# Patient Record
Sex: MALE | Race: WHITE
[De-identification: names, ages, dates, MRNs, and addresses within clinical notes are randomized per-mention and may not be internally consistent; named-entity substitution may affect disease eponyms.]

---

## 2017-04-19 ENCOUNTER — HOSPITAL ENCOUNTER (OUTPATIENT)
Dept: HOSPITAL 47 - LABWHC1 | Age: 72
Discharge: HOME | End: 2017-04-19
Payer: MEDICARE

## 2017-04-19 DIAGNOSIS — M60.9: ICD-10-CM

## 2017-04-19 DIAGNOSIS — I10: ICD-10-CM

## 2017-04-19 DIAGNOSIS — I48.0: Primary | ICD-10-CM

## 2017-04-19 LAB
ALP SERPL-CCNC: 58 U/L (ref 38–126)
ALT SERPL-CCNC: 34 U/L (ref 21–72)
ANION GAP SERPL CALC-SCNC: 10 MMOL/L
AST SERPL-CCNC: 33 U/L (ref 17–59)
BUN SERPL-SCNC: 17 MG/DL (ref 9–20)
CALCIUM SPEC-MCNC: 10.1 MG/DL (ref 8.4–10.2)
CH: 34.2
CHCM: 35.4
CHLORIDE SERPL-SCNC: 102 MMOL/L (ref 98–107)
CK SERPL-CCNC: 358 U/L (ref 55–170)
CO2 SERPL-SCNC: 29 MMOL/L (ref 22–30)
ERYTHROCYTE [DISTWIDTH] IN BLOOD BY AUTOMATED COUNT: 4.31 M/UL (ref 4.3–5.9)
ERYTHROCYTE [DISTWIDTH] IN BLOOD: 13.8 % (ref 11.5–15.5)
GLUCOSE SERPL-MCNC: 93 MG/DL (ref 74–99)
HCT VFR BLD AUTO: 41.8 % (ref 39–53)
HDW: 2.78
HGB BLD-MCNC: 14.7 GM/DL (ref 13–17.5)
MCH RBC QN AUTO: 34.1 PG (ref 25–35)
MCHC RBC AUTO-ENTMCNC: 35.1 G/DL (ref 31–37)
MCV RBC AUTO: 97.2 FL (ref 80–100)
NON-AFRICAN AMERICAN GFR(MDRD): >60
POTASSIUM SERPL-SCNC: 3.4 MMOL/L (ref 3.5–5.1)
PROT SERPL-MCNC: 6.8 G/DL (ref 6.3–8.2)
SODIUM SERPL-SCNC: 141 MMOL/L (ref 137–145)
WBC # BLD AUTO: 24.6 K/UL (ref 3.8–10.6)

## 2017-04-19 PROCEDURE — 80053 COMPREHEN METABOLIC PANEL: CPT

## 2017-04-19 PROCEDURE — 85027 COMPLETE CBC AUTOMATED: CPT

## 2017-04-19 PROCEDURE — 82550 ASSAY OF CK (CPK): CPT

## 2017-04-19 PROCEDURE — 84443 ASSAY THYROID STIM HORMONE: CPT

## 2017-04-19 PROCEDURE — 36415 COLL VENOUS BLD VENIPUNCTURE: CPT

## 2017-04-19 PROCEDURE — 82553 CREATINE MB FRACTION: CPT

## 2017-04-25 ENCOUNTER — HOSPITAL ENCOUNTER (OUTPATIENT)
Dept: HOSPITAL 47 - CATHEP | Age: 72
LOS: 1 days | Discharge: HOME | End: 2017-04-26
Payer: MEDICARE

## 2017-04-25 VITALS — BODY MASS INDEX: 35.5 KG/M2

## 2017-04-25 DIAGNOSIS — Z87.891: ICD-10-CM

## 2017-04-25 DIAGNOSIS — Z79.82: ICD-10-CM

## 2017-04-25 DIAGNOSIS — N40.0: ICD-10-CM

## 2017-04-25 DIAGNOSIS — Z79.01: ICD-10-CM

## 2017-04-25 DIAGNOSIS — Z79.84: ICD-10-CM

## 2017-04-25 DIAGNOSIS — Z99.89: ICD-10-CM

## 2017-04-25 DIAGNOSIS — Z82.49: ICD-10-CM

## 2017-04-25 DIAGNOSIS — Z95.0: ICD-10-CM

## 2017-04-25 DIAGNOSIS — E11.9: ICD-10-CM

## 2017-04-25 DIAGNOSIS — Z79.899: ICD-10-CM

## 2017-04-25 DIAGNOSIS — I10: ICD-10-CM

## 2017-04-25 DIAGNOSIS — G47.33: ICD-10-CM

## 2017-04-25 DIAGNOSIS — I48.0: Primary | ICD-10-CM

## 2017-04-25 DIAGNOSIS — I47.1: ICD-10-CM

## 2017-04-25 LAB
ANION GAP SERPL CALC-SCNC: 8 MMOL/L
BUN SERPL-SCNC: 18 MG/DL (ref 9–20)
CALCIUM SPEC-MCNC: 10.2 MG/DL (ref 8.4–10.2)
CELLS COUNTED: 200
CH: 34.4
CHCM: 35.5
CHLORIDE SERPL-SCNC: 109 MMOL/L (ref 98–107)
CO2 SERPL-SCNC: 24 MMOL/L (ref 22–30)
ERYTHROCYTE [DISTWIDTH] IN BLOOD BY AUTOMATED COUNT: 4.54 M/UL (ref 4.3–5.9)
ERYTHROCYTE [DISTWIDTH] IN BLOOD: 14.1 % (ref 11.5–15.5)
GLUCOSE BLD-MCNC: 103 MG/DL (ref 75–99)
GLUCOSE BLD-MCNC: 124 MG/DL (ref 75–99)
GLUCOSE BLD-MCNC: 134 MG/DL (ref 75–99)
GLUCOSE BLD-MCNC: 136 MG/DL (ref 75–99)
GLUCOSE BLD-MCNC: 138 MG/DL (ref 75–99)
GLUCOSE BLD-MCNC: 143 MG/DL (ref 75–99)
GLUCOSE BLD-MCNC: 144 MG/DL (ref 75–99)
GLUCOSE SERPL-MCNC: 116 MG/DL (ref 74–99)
HCT VFR BLD AUTO: 44.2 % (ref 39–53)
HDW: 2.78
HGB BLD-MCNC: 15.3 GM/DL (ref 13–17.5)
MCH RBC QN AUTO: 33.8 PG (ref 25–35)
MCHC RBC AUTO-ENTMCNC: 34.7 G/DL (ref 31–37)
MCV RBC AUTO: 97.3 FL (ref 80–100)
NON-AFRICAN AMERICAN GFR(MDRD): >60
POTASSIUM SERPL-SCNC: 3.9 MMOL/L (ref 3.5–5.1)
SODIUM SERPL-SCNC: 141 MMOL/L (ref 137–145)
WBC # BLD AUTO: 30.8 K/UL (ref 3.8–10.6)
WBC (PEROX): 30.33

## 2017-04-25 PROCEDURE — 93609 INTRA-VNTR MAPG TCHYCAR SITE: CPT

## 2017-04-25 PROCEDURE — 93656 COMPRE EP EVAL ABLTJ ATR FIB: CPT

## 2017-04-25 PROCEDURE — 80048 BASIC METABOLIC PNL TOTAL CA: CPT

## 2017-04-25 PROCEDURE — 93005 ELECTROCARDIOGRAM TRACING: CPT

## 2017-04-25 PROCEDURE — 93662 INTRACARDIAC ECG (ICE): CPT

## 2017-04-25 PROCEDURE — 85025 COMPLETE CBC W/AUTO DIFF WBC: CPT

## 2017-04-25 PROCEDURE — 83036 HEMOGLOBIN GLYCOSYLATED A1C: CPT

## 2017-04-25 PROCEDURE — 92960 CARDIOVERSION ELECTRIC EXT: CPT

## 2017-04-25 RX ADMIN — ATENOLOL SCH MG: 50 TABLET ORAL at 14:01

## 2017-04-25 RX ADMIN — FUROSEMIDE SCH MG: 20 TABLET ORAL at 17:09

## 2017-04-25 RX ADMIN — LISINOPRIL SCH MG: 20 TABLET ORAL at 20:41

## 2017-04-25 RX ADMIN — TERAZOSIN HYDROCHLORIDE SCH MG: 5 CAPSULE ORAL at 20:41

## 2017-04-25 RX ADMIN — SODIUM CHLORIDE, PRESERVATIVE FREE SCH ML: 5 INJECTION INTRAVENOUS at 20:49

## 2017-04-25 RX ADMIN — POTASSIUM CHLORIDE SCH MEQ: 750 TABLET, EXTENDED RELEASE ORAL at 20:41

## 2017-04-25 NOTE — P.PCN
Preoperative Diagnosis: 


Procedures performed (PVI - CRYO Ablation)


Invasive hemodynamic monitoring while general anesthesia, right femoral 

arterial line for monitoring and sampling


Comprehensive diagnostic EP study with attempted arrhythmia induction


CS pacing and recording


Catheter the mapping of the tachycardia (NOT 3D mapping)


Intracardiac echocardiography


Pulmonary vein isolation with transseptal and comprehensive EPS, [id number]


Electrical cardioversion for atrial tachycardia





Procedure details


Patient was brought to the EP lab in a fasting state.


Written informed consent was obtained prior to the procedure.


Procedure performed under general anesthesia


After initial muscle relaxant use, muscle relaxants were not given thereafter 

in order to assess phrenic nerve during procedure





Patient prepped and draped as per protocol


Full cryo-set up with standard preparation of the cryoablation tools done





Femoral Venous access obtained on the right and left groins


Sheaths placed


Diagnostic catheters for the high right atrium, phrenic nerve stimulation and 

pacing, His bundle, RV and coronary sinus placed


Intracardiac echo catheter placed


Long sheath placed in the right atrium


Left and right transseptal catheterization performed under intracardiac echo 

guidance


Intravenous heparin with aCT above 300


Later, catheter positioning and balloon positioning under intracardiac echo





Baseline measurements


Patient in atrial fibrillation started study,  seconds,  ms HV 

interval 58 ms





Comprehensive diagnostic EP study 


Atrial pacing performed from the high right atrium and the coronary sinus


Burst pacing in the RV


Transseptal catheterization performed


RA pressure 19/6/14


LA pressure 22/8/15





Transseptal catheterization performed with standard sheath.  The cryoablation 

sheath was then placed with an over the wire exchange without any acute 

complications.





All 4 pulmonary veins were isolated in the following sequence: Left superior 

followed by left inferior followed by right superior followed by right inferior





The cryo-ablation balloon was placed at the os of each vein


1.5 mL of IV dye was injected to confirm an occluded vein


Goal during cryoablation was to achieve -30C in the first 30 seconds.  If not 

the balloon was repositioned to obtain this result


After completion of Cryoblation with durations from 180-240 seconds, entrance 

block was confirmed with the Attain circular catheter in a roving fashion 

around the antrum of the pulmonary veins


Phrenic nerve pacing was performed from the SVC, right innominate vein area and 

diaphragm voltage was monitored as well as manually





Left superior pulmonary vein


First cryoablation 


Duration of cryoablation lesion 225


-30C achieved at 30 seconds


-40C achieved at 21 seconds


Minimum temperature achieved -55C


Thaw time greater than 10 seconds





Second cryoablation


Duration of cryoablation lesion 120 seconds


-30C achieved at 30 seconds


-40C achieved at 48 seconds


Minimum temperature achieved -44C


Thaw time 15.5 seconds


Vein isolated yes








Left inferior pulmonary vein


First cryoablation 


Duration of cryoablation lesion 240 seconds


-30C achieved at 33 seconds


-40C achieved at 90 seconds


Minimum temperature achieved -46C


Thaw time 1.3 seconds


Vein isolated 





Second cryoablation


Duration of cryoablation lesion 120 seconds


-30C achieved at 25 seconds


-40C achieveFirst cryoablation 48 seconds


Minimum temperature achieved -51C


Thaw time 11.2 seconds


Vein isolated yes





Right superior pulmonary vein, during phrenic nerve pacing





First cryoablation 


Duration of cryoablation lesion 180 seconds


-30C achieved at 27 seconds


-40C achieved at 40 seconds


Minimum temperature achieved -57C


Thaw time 23 seconds





Second cryoablation


Duration of cryoablation lesion 175 seconds


-30C achieved at 27 seconds


-40C achieved at 40 seconds


Minimum temperature achieved -56C


Thaw time 26 seconds


Vein isolated yes





Right inferior pulmonary vein, during phrenic nerve pacing


First cryoablation 


Duration of cryoablation lesion 120 seconds


-30C achieved at 32 seconds


-40C achieved at 96 seconds


Minimum temperature achieved -41C


Thaw time 6 seconds





Second cryoablation


Duration of cryoablation lesion 180 seconds


-30C achieved at 25 seconds


-40C achieved at 37 seconds


Minimum temperature achieved -56C


Thaw time 18 seconds


Vein isolated yes





At the end of the procedure the Achieve catheter was once again used to check 

for entrance block


Phrenic nerve stimulation was performed to confirm diaphragmatic stimulation 

the end of the procedure


Cine fluoroscopy was performed at the very end of the procedure to confirm 

movement of both diaphragms with inspiration and expiration





At the end of the procedure the patient was extubated


Heparin was reversed


Venous sheaths were removed and hemostasis assured





Result


Successful pulmonary vein isolation using cryo-ablation


Complete entrance block in all 4 veins confirmed


No evidence for phrenic nerve injury


Electrical cardioversion for atrial tachycardia 215 Josiah seconds






































Anesthesia: GETA


Disposition: floor

## 2017-04-25 NOTE — P.PCN
Preoperative Diagnosis: 


Dual-chamber biventricular pacemaker interrogated prior to the procedure


Reprogrammed to DDD 60 PPM





At the end of the procedure


Dual-chamber biventricular pacemaker interrogated, lead impedance is stable, 

sensing within normal limits


Reprogrammed once again to DDDR  the short AV delay

## 2017-04-26 VITALS
SYSTOLIC BLOOD PRESSURE: 116 MMHG | RESPIRATION RATE: 14 BRPM | DIASTOLIC BLOOD PRESSURE: 59 MMHG | TEMPERATURE: 97.9 F | HEART RATE: 56 BPM

## 2017-04-26 LAB
ANION GAP SERPL CALC-SCNC: 8 MMOL/L
BUN SERPL-SCNC: 13 MG/DL (ref 9–20)
CALCIUM SPEC-MCNC: 9.9 MG/DL (ref 8.4–10.2)
CHLORIDE SERPL-SCNC: 104 MMOL/L (ref 98–107)
CO2 SERPL-SCNC: 28 MMOL/L (ref 22–30)
GLUCOSE BLD-MCNC: 109 MG/DL (ref 75–99)
GLUCOSE BLD-MCNC: 122 MG/DL (ref 75–99)
GLUCOSE SERPL-MCNC: 114 MG/DL (ref 74–99)
HEMOGLOBIN A1C: 6 % (ref 4.2–6.1)
NON-AFRICAN AMERICAN GFR(MDRD): >60
POTASSIUM SERPL-SCNC: 3.8 MMOL/L (ref 3.5–5.1)
SODIUM SERPL-SCNC: 140 MMOL/L (ref 137–145)

## 2017-04-26 RX ADMIN — TERAZOSIN HYDROCHLORIDE SCH MG: 5 CAPSULE ORAL at 09:24

## 2017-04-26 RX ADMIN — FUROSEMIDE SCH MG: 20 TABLET ORAL at 09:24

## 2017-04-26 RX ADMIN — POTASSIUM CHLORIDE SCH MEQ: 750 TABLET, EXTENDED RELEASE ORAL at 09:24

## 2017-04-26 RX ADMIN — LISINOPRIL SCH MG: 20 TABLET ORAL at 09:24

## 2017-04-26 RX ADMIN — ATENOLOL SCH MG: 50 TABLET ORAL at 09:24

## 2017-04-26 RX ADMIN — SODIUM CHLORIDE, PRESERVATIVE FREE SCH ML: 5 INJECTION INTRAVENOUS at 09:24

## 2017-04-26 NOTE — P.DS
Providers


Attending physician: 


Allan Coker





Primary care physician: 


Richard Cerna





[facility] Course: 





Patient is doing well.  He did complain of some palpitations.  No chest 

discomfort no dizziness no lightheadedness no breathing trouble





On examination he is afebrile him a temperature 98.9F, pulse rate in the 80s, 

blood pressure 131/62 mmHg


No JVD and


Normal heart sounds normal S1 normal S2


No murmurs no gallops


No rub


Breath sounds are normal


No rhonchi no crackles


Abdomen soft nontender


Groins have healed well no hematoma





Impression


Atrial fibrillation, paroxysmal, status post cryoablation, pulmonary vein 

isolation


Residual atrial tachycardia cycle length 215 ms, extrapulmonary focus


Diabetes


Central obesity


Bradycardia status post biventricular pacemaker


Normal LV function





Plan


Increase flecainide 200 mg twice daily


Patient states that he has muscle pains with Toprol give him switching to 

atenolol 50 mg daily





Continue anticoagulation lifelong





Plan


Anticoagulated and antiarrhythmic drug therapy for the management


If he continues to have symptoms and documented atrial fibrillation despite 

this drug does, mapping and ablation will be considered





Follow-up within the week for a groin check


Patient Condition at Discharge: Stable





Plan - Discharge Summary


New Discharge Prescriptions: 


Atenolol [Tenormin] 50 mg PO DAILY #1 tab


Flecainide [Tambocor] 100 mg PO Q12HR #1 tablet


Discharge Medication List





Furosemide 20 mg PO BID 07/25/16 [History]


Lisinopril [Zestril] 20 mg PO BID 07/25/16 [History]


Terazosin [Hytrin] 5 mg PO BID 07/25/16 [History]


Rivaroxaban [Xarelto] 20 mg PO DAILY 08/29/16 [History]


metFORMIN HCL [Glucophage] 500 mg PO BID 08/29/16 [History]


sitaGLIPtin PHOSPHATE [Januvia] 50 mg PO QAM 08/29/16 [History]


Potassium Chloride [Klor-Con 10] 10 meq PO BID 04/21/17 [History]


Atenolol [Tenormin] 50 mg PO DAILY #1 tab 04/26/17 [Rx]


Flecainide [Tambocor] 100 mg PO Q12HR #1 tablet 04/26/17 [Rx]








Activity/Diet/Wound Care/Special Instructions: 


Post EP study - Ablation instructions





1.  Keep access sites dry for 2 days.


2.  No heavy lifting or straining for 2 days.


3.  Avoid bending the hips repeatedly for 2 days.


4.  You may go up and down stairs slowly  





Call if the following is noted





1.  Bleeding, increasing swelling or pain at the access sites.


2.  Increasing chest discomfort, especially upon taking a deep breath.


3.  Increasing shortness of breath, at rest or with exertion.


4.  Undue cough / phlegm


5.  Difficulty or pain while swallowing.


6.  Pain or change in color in the extremities.


7.  Fever, chills, rigors.


8.  Increasing headache or neurologic symptoms.


9.  Dizziness, fainting, palpitations





Groin check on Tuesday attention trisha

## 2020-07-06 ENCOUNTER — HOSPITAL ENCOUNTER (OUTPATIENT)
Dept: HOSPITAL 47 - CATHEP | Age: 75
Discharge: HOME | End: 2020-07-06
Attending: INTERNAL MEDICINE
Payer: MEDICARE

## 2020-07-06 VITALS — BODY MASS INDEX: 34.5 KG/M2

## 2020-07-06 VITALS — SYSTOLIC BLOOD PRESSURE: 126 MMHG | DIASTOLIC BLOOD PRESSURE: 76 MMHG | HEART RATE: 62 BPM

## 2020-07-06 VITALS — TEMPERATURE: 97.8 F | RESPIRATION RATE: 16 BRPM

## 2020-07-06 DIAGNOSIS — C91.10: ICD-10-CM

## 2020-07-06 DIAGNOSIS — I48.19: Primary | ICD-10-CM

## 2020-07-06 DIAGNOSIS — Z79.84: ICD-10-CM

## 2020-07-06 DIAGNOSIS — E11.9: ICD-10-CM

## 2020-07-06 DIAGNOSIS — C61: ICD-10-CM

## 2020-07-06 DIAGNOSIS — E78.5: ICD-10-CM

## 2020-07-06 DIAGNOSIS — I10: ICD-10-CM

## 2020-07-06 DIAGNOSIS — I47.2: ICD-10-CM

## 2020-07-06 DIAGNOSIS — Z79.899: ICD-10-CM

## 2020-07-06 DIAGNOSIS — E66.9: ICD-10-CM

## 2020-07-06 DIAGNOSIS — G45.3: ICD-10-CM

## 2020-07-06 DIAGNOSIS — I87.1: ICD-10-CM

## 2020-07-06 DIAGNOSIS — Z95.0: ICD-10-CM

## 2020-07-06 DIAGNOSIS — I49.3: ICD-10-CM

## 2020-07-06 DIAGNOSIS — G47.33: ICD-10-CM

## 2020-07-06 LAB
ANION GAP SERPL CALC-SCNC: 7 MMOL/L
BUN SERPL-SCNC: 20 MG/DL (ref 9–20)
CALCIUM SPEC-MCNC: 10.1 MG/DL (ref 8.4–10.2)
CHLORIDE SERPL-SCNC: 105 MMOL/L (ref 98–107)
CO2 SERPL-SCNC: 26 MMOL/L (ref 22–30)
GLUCOSE BLD-MCNC: 124 MG/DL (ref 75–99)
GLUCOSE SERPL-MCNC: 131 MG/DL (ref 74–99)
POTASSIUM SERPL-SCNC: 3.4 MMOL/L (ref 3.5–5.1)
SODIUM SERPL-SCNC: 138 MMOL/L (ref 137–145)

## 2020-07-06 PROCEDURE — 36005 INJECTION EXT VENOGRAPHY: CPT

## 2020-07-06 PROCEDURE — 80048 BASIC METABOLIC PNL TOTAL CA: CPT

## 2020-07-06 PROCEDURE — 75820 VEIN X-RAY ARM/LEG: CPT

## 2020-07-06 PROCEDURE — 76000 FLUOROSCOPY <1 HR PHYS/QHP: CPT

## 2020-07-06 PROCEDURE — 92960 CARDIOVERSION ELECTRIC EXT: CPT

## 2020-07-06 NOTE — P.PCN
Preoperative Diagnosis: 


Left upper extremity venogram


15 mL of IV dye injected at the left upper extremity


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





Plan


At the time of pacemaker generator change the nonfunctioning His bundle lead 

will be removed and the original RV apical lead will be plugged into the RV port


The LV lead is functioning normally

## 2020-07-06 NOTE — P.PCN
Preoperative Diagnosis: 


Diagnosis


Lead in the RV port has elevated thresholds


Persistent symptomatic atrial fibrillation


Frequent PVCs


History of A. fib ablation, successful


History of biventricular pacemaker for severe bradycardia, with LV lead 

implanted at [place] and functioning well


His bundle lead implanted at [place] subsequently


Non-capture of this His bundle lead at high output





Procedure #1


Cinefluoroscopy cinefluoroscopy of the leads was performed


The patient has an atrial lead in the right atrial appendage


LV lead in stable position


RV lead in the apex of the right ventricle which is


A Metronic 3830-lead screwed in the His bundle area





This [employer] pacemaker was interrogated


Biventricular pacemaker serial number [id number]


Atrial pacing threshold 1 V at 0.4 ms, pacing impedance 490 ohms P waves 1 mV 

patient in A. fib


LV lead threshold 1 V at 0.5 ms, P4-can pacing impedance 5 and 30 ohms, P4-can


Threshold in in 3-M2 is 1 warted 1.5 ms





Cardioversion for atrial fibrillation, persistent, symptomatic


Successful electrical cardioversion to an atrial paced rhythm with a single 

shock 360 J biphasic in AP configuration





Device was then programmed to DDDR 





Plan


Patient feels that he is intolerant of lisinopril and has a lot of side effects 

from this


I will switch to losartan 50 mg by mouth daily in the evening and increase 

metoprolol succinate 100 mg in the morning


I emphasized the need for continuing anticoagulation for stroke prevention 

particularly since he has a past history of amaurosis fugax

## 2021-06-15 ENCOUNTER — HOSPITAL ENCOUNTER (OUTPATIENT)
Dept: HOSPITAL 47 - CATHEP | Age: 76
Discharge: HOME | End: 2021-06-15
Attending: INTERNAL MEDICINE
Payer: MEDICARE

## 2021-06-15 VITALS — SYSTOLIC BLOOD PRESSURE: 154 MMHG | HEART RATE: 49 BPM | DIASTOLIC BLOOD PRESSURE: 78 MMHG

## 2021-06-15 VITALS — TEMPERATURE: 97.9 F | RESPIRATION RATE: 16 BRPM

## 2021-06-15 VITALS — BODY MASS INDEX: 36.1 KG/M2

## 2021-06-15 DIAGNOSIS — I83.90: ICD-10-CM

## 2021-06-15 DIAGNOSIS — E66.9: ICD-10-CM

## 2021-06-15 DIAGNOSIS — E72.12: ICD-10-CM

## 2021-06-15 DIAGNOSIS — Z72.0: ICD-10-CM

## 2021-06-15 DIAGNOSIS — I42.0: ICD-10-CM

## 2021-06-15 DIAGNOSIS — Z20.822: ICD-10-CM

## 2021-06-15 DIAGNOSIS — Z45.010: Primary | ICD-10-CM

## 2021-06-15 DIAGNOSIS — E11.9: ICD-10-CM

## 2021-06-15 DIAGNOSIS — Z79.899: ICD-10-CM

## 2021-06-15 DIAGNOSIS — I48.91: ICD-10-CM

## 2021-06-15 DIAGNOSIS — Z85.46: ICD-10-CM

## 2021-06-15 DIAGNOSIS — Z79.84: ICD-10-CM

## 2021-06-15 DIAGNOSIS — I10: ICD-10-CM

## 2021-06-15 DIAGNOSIS — I44.2: ICD-10-CM

## 2021-06-15 DIAGNOSIS — Z84.81: ICD-10-CM

## 2021-06-15 DIAGNOSIS — G45.3: ICD-10-CM

## 2021-06-15 DIAGNOSIS — Z98.890: ICD-10-CM

## 2021-06-15 DIAGNOSIS — Z85.6: ICD-10-CM

## 2021-06-15 DIAGNOSIS — Z90.89: ICD-10-CM

## 2021-06-15 DIAGNOSIS — Z79.01: ICD-10-CM

## 2021-06-15 DIAGNOSIS — G47.33: ICD-10-CM

## 2021-06-15 LAB
ANION GAP SERPL CALC-SCNC: 5 MMOL/L
BASOPHILS # BLD AUTO: 0.1 K/UL (ref 0–0.2)
BASOPHILS NFR BLD AUTO: 1 %
BUN SERPL-SCNC: 15 MG/DL (ref 9–20)
CALCIUM SPEC-MCNC: 10.4 MG/DL (ref 8.4–10.2)
CHLORIDE SERPL-SCNC: 103 MMOL/L (ref 98–107)
CO2 SERPL-SCNC: 33 MMOL/L (ref 22–30)
EOSINOPHIL # BLD AUTO: 0.1 K/UL (ref 0–0.7)
EOSINOPHIL NFR BLD AUTO: 0 %
ERYTHROCYTE [DISTWIDTH] IN BLOOD BY AUTOMATED COUNT: 4.43 M/UL (ref 4.3–5.9)
ERYTHROCYTE [DISTWIDTH] IN BLOOD: 13.8 % (ref 11.5–15.5)
GLUCOSE BLD-MCNC: 158 MG/DL (ref 75–99)
GLUCOSE SERPL-MCNC: 161 MG/DL (ref 74–99)
HCT VFR BLD AUTO: 44.7 % (ref 39–53)
HGB BLD-MCNC: 15.6 GM/DL (ref 13–17.5)
LYMPHOCYTES # SPEC AUTO: 20 K/UL (ref 1–4.8)
LYMPHOCYTES NFR SPEC AUTO: 81 %
MCH RBC QN AUTO: 35.2 PG (ref 25–35)
MCHC RBC AUTO-ENTMCNC: 34.9 G/DL (ref 31–37)
MCV RBC AUTO: 100.8 FL (ref 80–100)
MONOCYTES # BLD AUTO: 0.2 K/UL (ref 0–1)
MONOCYTES NFR BLD AUTO: 1 %
NEUTROPHILS # BLD AUTO: 3.4 K/UL (ref 1.3–7.7)
NEUTROPHILS NFR BLD AUTO: 14 %
PLATELET # BLD AUTO: 131 K/UL (ref 150–450)
POTASSIUM SERPL-SCNC: 3.6 MMOL/L (ref 3.5–5.1)
SODIUM SERPL-SCNC: 141 MMOL/L (ref 137–145)
WBC # BLD AUTO: 24.8 K/UL (ref 3.8–10.6)

## 2021-06-15 PROCEDURE — 85025 COMPLETE CBC W/AUTO DIFF WBC: CPT

## 2021-06-15 PROCEDURE — 33229 REMV&REPLC PM GEN MULT LEADS: CPT

## 2021-06-15 PROCEDURE — 87635 SARS-COV-2 COVID-19 AMP PRB: CPT

## 2021-06-15 PROCEDURE — 80048 BASIC METABOLIC PNL TOTAL CA: CPT

## 2021-06-15 NOTE — CE
CARDIAC ELECTROPHYSIOLOGY REPORT



Jerry Humerickhouse has a biventricular pacemaker whose generator is at [facility].  He was

brought in for a biventricular pacemaker generator change.



The patient is brought to the EP lab in a fasting state. Written informed consent was

obtained prior to the procedure.  The left shoulder area was prepped and draped as per

protocol.  1% lidocaine was used for local anesthesia.  A 4 cm incision was made over

the generator and carried down to the level of the generator.  The generator was

explanted.  Partial capsulectomy was performed.



Originally, he had an atrial lead, RV lead and LV lead implanted by myself several

years back.  Subsequently, the patient moved to [place] and a His bundle lead was added

and pacing was performed with HIS and LV combined.  However, the His bundle has

complete non capture and therefore I plan to extract the His bundle during this

procedure.



The His bundle lead was freed from the capsule and the lead sleeve and then the lead

was extracted with manual traction.  He remained stable through this part of the

procedure.



The RV lead cap was removed and a new generator was implanted connected to the RV lead,

LV lead and atrial lead.



The atrial lead is an Isoflex Optum 1944, 52 cm in length and serial number [id number].

The pacing threshold 0.9 V at 0.4 milliseconds.  P waves 1.6 mV, pacing impedance 490

ohms.



The RV lead is a tendril SDX, model #2088TC, 58 cm in length and serial number [id number]

[id number].  Pacing threshold 1.5 V at 0.4 milliseconds, pacing impedance of 440 ohms.

The LV lead was a Quattro 1458 Q, 75 cm in length and serial number [id number].  The

pacing threshold M3-M2 was 2 V at 0.5 milliseconds, pacing impedance of 990 ohms.



The new generator implanted was a biventricular pacemaker, [employer] model

number DJ3724, serial #[id number].



The device was then programmed to DDD mode  with an AV delay of about 200

milliseconds and LV offset of 20 milliseconds.



The generator was then placed in subfascial pocket and the wound was closed in 3 layers

and dressed per protocol.



RESULT:

Successful extraction of the His bundle lead and and biventricular pacemaker generator

change.



PLAN:

IV vancomycin and 24 hours of oral Keflex.





MMODL / IJN: [id number] / Job#: [id number]

## 2021-06-15 NOTE — P.EPPROC
- EP Procedure Note


Electrophysiology Procedure Note: 





Cinefluoroscopy of the leads


Cinefluoroscopy of the leads was performed prior to the procedure the atrial 

lead is in the right atrial appendage


His bundle lead appeared to be in the vicinity of the tricuspid area on 

fluoroscopy of his complete non-capture this fundally


LV lead in the lateral vein


RV lead in the apex





At the end of the procedure after extraction of the His bundle lead V1 left with

the atrial lead RV lead in the LV lead


No fractures or breaks noted.

## 2023-04-10 ENCOUNTER — HOSPITAL ENCOUNTER (OUTPATIENT)
Dept: HOSPITAL 47 - CATHCVL | Age: 78
Discharge: HOME | End: 2023-04-10
Attending: INTERNAL MEDICINE
Payer: MEDICARE

## 2023-04-10 VITALS — RESPIRATION RATE: 16 BRPM

## 2023-04-10 VITALS — SYSTOLIC BLOOD PRESSURE: 142 MMHG | HEART RATE: 70 BPM | DIASTOLIC BLOOD PRESSURE: 79 MMHG

## 2023-04-10 VITALS — BODY MASS INDEX: 30.9 KG/M2

## 2023-04-10 DIAGNOSIS — I25.10: Primary | ICD-10-CM

## 2023-04-10 DIAGNOSIS — R94.39: ICD-10-CM

## 2023-04-10 LAB — GLUCOSE BLD-MCNC: 117 MG/DL (ref 70–110)

## 2023-04-10 PROCEDURE — 93799 UNLISTED CV SVC/PROCEDURE: CPT

## 2023-04-10 PROCEDURE — 93458 L HRT ARTERY/VENTRICLE ANGIO: CPT

## 2023-04-10 NOTE — P.CARDCATH
Description of Procedure: 


PROCEDURES PERFORMED: Left heart catheterization, bilateral coronary 

angiography, iFR RCA





INDICATION: Abnormal stress test with inferior fixed defect with inferolateral 

reversibility





CONSENT:I have discussed the risks, benefits and alternative therapies for the 

above-mentioned procedure and for both sedation/analgesia as well as necessary 

blood product administration, if indicated, as they pertain to this patient.  

The patient has indicated understanding and acceptance of the risks and 

procedures discussed.





PROCEDURE: After the risks, benefits and alternatives of the above mentioned 

procedure explained in detail with the patient, informed consent was obtained.  

Patient was taken to the catheterization lab and prepped and draped in usual 

fashion.  1% lidocaine was used to anesthetize the right radial artery.  A 6-

Swedish sheath was placed in the right radial artery using modified Seldinger 

technique.  Left coronary angiography was performed with a 5-Swedish JL 3.0 

catheter and right coronary angiography was performed with a 5-Swedish JR5 

catheter in various views.  A 5-Swedish FR5 catheter was inserted into the left 

ventricle and pressure measurements were obtained. 





The decision was made to perform iFR of the RCA.  Heparin was given.  A 6-Swedish

ER to guide was used to engage the RCA.  A 0.014 pressure wire was advanced into

the proximal RCA and normalized.  The wire was then advanced 1 cm distal to the 

RCA lesion and iFR was performed and was normal at 0.97.  The wire and catheter 

were then removed.





The right radial sheath was removed and a TR band was placed with hemostasis 

achieved.  The patient tolerated the procedure well.  Patient was transported 

back to the post catheterization holding area in stable condition.





Conscious Sedation: Patient was monitored under the direct supervision of myself

for conscious sedation using Versed and fentanyl for a total duration of 25 

minutes   


HEMODYNAMICS: 


Aorta: 145/93


LV: 134/5, LVEDP 12.





SELECTIVE CORONARY ARTERIOGRAPHY: 


LEFT MAIN: The left main is a large caliber vessel which bifurcates into the LAD

and circumflex.  There is an ostial 30-40% stenosis as well as a distal left 

main 30% stenosis


LEFT ANTERIOR DESCENDING CORONARY ARTERY: LAD is a large caliber vessel which 

wraps around to the apex.  There is diffuse disease including 30% proximal LAD 

and 30-40% mid LAD stenosis.


LEFT CIRCUMFLEX CORONARY ARTERY: Left circumflex is a moderate caliber vessel 

with mild luminal irregularities


RIGHT CORONARY ARTERY: The right coronary artery is a large caliber vessel which

gives off a PDA and PLV branch and is the dominant vessel.  There is diffuse 

mild tenderness 20% stenosis and a proximal RCA 50% stenosis.





FINAL IMPRESSION: 


1.  Mild to moderate CAD as described above including 50% proximal RCA stenosis,

30-40% ostial left main stenosis, 40% LAD stenosis.


2.  iFR RCA normal at 0.97


3.  Normal left sided filling pressures





PLAN: 


1.  Aggressive risk factor modification per most recent ACC/AHA guidelines.


2.  Left main disease appears better than prior imaging from 2016 and does not 

appear significant.  Given reversibility in the inferior lateral area with 

normal iFR would continue with medical therapy.

## 2023-06-27 ENCOUNTER — HOSPITAL ENCOUNTER (OUTPATIENT)
Dept: HOSPITAL 47 - CATHEP | Age: 78
Setting detail: OBSERVATION
LOS: 2 days | Discharge: HOME | End: 2023-06-29
Attending: INTERNAL MEDICINE | Admitting: INTERNAL MEDICINE
Payer: MEDICARE

## 2023-06-27 DIAGNOSIS — Z98.42: ICD-10-CM

## 2023-06-27 DIAGNOSIS — Z95.0: ICD-10-CM

## 2023-06-27 DIAGNOSIS — Z82.49: ICD-10-CM

## 2023-06-27 DIAGNOSIS — E11.9: ICD-10-CM

## 2023-06-27 DIAGNOSIS — I25.10: ICD-10-CM

## 2023-06-27 DIAGNOSIS — C91.10: ICD-10-CM

## 2023-06-27 DIAGNOSIS — I50.9: ICD-10-CM

## 2023-06-27 DIAGNOSIS — Z80.3: ICD-10-CM

## 2023-06-27 DIAGNOSIS — J44.9: ICD-10-CM

## 2023-06-27 DIAGNOSIS — I42.9: ICD-10-CM

## 2023-06-27 DIAGNOSIS — Z87.891: ICD-10-CM

## 2023-06-27 DIAGNOSIS — I48.19: Primary | ICD-10-CM

## 2023-06-27 DIAGNOSIS — Z85.46: ICD-10-CM

## 2023-06-27 DIAGNOSIS — Z87.442: ICD-10-CM

## 2023-06-27 DIAGNOSIS — I11.0: ICD-10-CM

## 2023-06-27 DIAGNOSIS — Z98.41: ICD-10-CM

## 2023-06-27 LAB
ALBUMIN SERPL-MCNC: 3.8 G/DL (ref 3.5–5)
ALBUMIN/GLOB SERPL: 1.5 {RATIO}
ALP SERPL-CCNC: 76 U/L (ref 38–126)
ALT SERPL-CCNC: 25 U/L (ref 4–49)
ANION GAP SERPL CALC-SCNC: 10 MMOL/L
AST SERPL-CCNC: 69 U/L (ref 17–59)
BUN SERPL-SCNC: 13 MG/DL (ref 9–20)
CALCIUM SPEC-MCNC: 9.2 MG/DL (ref 8.4–10.2)
CELLS COUNTED: 200
CHLORIDE SERPL-SCNC: 106 MMOL/L (ref 98–107)
CK SERPL-CCNC: 269 U/L (ref 55–170)
CO2 SERPL-SCNC: 22 MMOL/L (ref 22–30)
ERYTHROCYTE [DISTWIDTH] IN BLOOD BY AUTOMATED COUNT: 4.49 M/UL (ref 4.3–5.9)
ERYTHROCYTE [DISTWIDTH] IN BLOOD: 14.6 % (ref 11.5–15.5)
GLOBULIN SER CALC-MCNC: 2.6 G/DL
GLUCOSE BLD-MCNC: 134 MG/DL (ref 70–110)
GLUCOSE BLD-MCNC: 151 MG/DL (ref 70–110)
GLUCOSE SERPL-MCNC: 161 MG/DL (ref 74–99)
HCT VFR BLD AUTO: 45.4 % (ref 39–53)
HGB BLD-MCNC: 15.1 GM/DL (ref 13–17.5)
LIPASE SERPL-CCNC: 182 U/L (ref 23–300)
LYMPHOCYTES # BLD MANUAL: 19.71 K/UL (ref 1–4.8)
MCH RBC QN AUTO: 33.7 PG (ref 25–35)
MCHC RBC AUTO-ENTMCNC: 33.3 G/DL (ref 31–37)
MCV RBC AUTO: 101.1 FL (ref 80–100)
MONOCYTES # BLD MANUAL: 0.44 K/UL (ref 0–1)
NEUTROPHILS NFR BLD MANUAL: 8 %
NEUTS SEG # BLD MANUAL: 1.75 K/UL (ref 1.3–7.7)
PLATELET # BLD AUTO: 99 K/UL (ref 150–450)
POTASSIUM SERPL-SCNC: 4.5 MMOL/L (ref 3.5–5.1)
PROT SERPL-MCNC: 6.4 G/DL (ref 6.3–8.2)
SODIUM SERPL-SCNC: 138 MMOL/L (ref 137–145)
WBC # BLD AUTO: 21.9 K/UL (ref 3.8–10.6)

## 2023-06-27 PROCEDURE — 94760 N-INVAS EAR/PLS OXIMETRY 1: CPT

## 2023-06-27 PROCEDURE — 85025 COMPLETE CBC W/AUTO DIFF WBC: CPT

## 2023-06-27 PROCEDURE — 93656 COMPRE EP EVAL ABLTJ ATR FIB: CPT

## 2023-06-27 PROCEDURE — 86850 RBC ANTIBODY SCREEN: CPT

## 2023-06-27 PROCEDURE — 82085 ASSAY OF ALDOLASE: CPT

## 2023-06-27 PROCEDURE — 94640 AIRWAY INHALATION TREATMENT: CPT

## 2023-06-27 PROCEDURE — 83690 ASSAY OF LIPASE: CPT

## 2023-06-27 PROCEDURE — 80053 COMPREHEN METABOLIC PANEL: CPT

## 2023-06-27 PROCEDURE — 97162 PT EVAL MOD COMPLEX 30 MIN: CPT

## 2023-06-27 PROCEDURE — 86900 BLOOD TYPING SEROLOGIC ABO: CPT

## 2023-06-27 PROCEDURE — 82550 ASSAY OF CK (CPK): CPT

## 2023-06-27 PROCEDURE — 86901 BLOOD TYPING SEROLOGIC RH(D): CPT

## 2023-06-27 PROCEDURE — 84443 ASSAY THYROID STIM HORMONE: CPT

## 2023-06-27 PROCEDURE — 93657 TX L/R ATRIAL FIB ADDL: CPT

## 2023-06-27 PROCEDURE — 83036 HEMOGLOBIN GLYCOSYLATED A1C: CPT

## 2023-06-27 RX ADMIN — NYSTATIN SCH APPLIC: 100000 POWDER TOPICAL at 19:56

## 2023-06-27 RX ADMIN — APIXABAN SCH MG: 5 TABLET, FILM COATED ORAL at 19:56

## 2023-06-27 RX ADMIN — NYSTATIN SCH: 100000 POWDER TOPICAL at 18:23

## 2023-06-27 RX ADMIN — NYSTATIN SCH APPLIC: 100000 POWDER TOPICAL at 10:22

## 2023-06-27 RX ADMIN — LOSARTAN POTASSIUM SCH MG: 50 TABLET, FILM COATED ORAL at 19:56

## 2023-06-27 RX ADMIN — METOPROLOL SUCCINATE SCH MG: 100 TABLET, EXTENDED RELEASE ORAL at 19:56

## 2023-06-27 NOTE — P.HPCAR
History of Present Illness





This is Dr. Coker dictating an H/P on this patient


The patient was interviewed and examined 





IMPRESSION / ASSESSMENT: 


Persistent atrial fibrillation that has failed amiodarone, asymptomatic 

tiredness fatigue


Coronary artery disease multivessel intermediate disease


Hypertension


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


Type 2 diabetes


Complete heart block status post biventricular pacing with an LV lead, [employer]





PLAN:


A. fib ablation





HPI


Patient continues to be short of breath tired fatigue


Amiodarone has not maintains sinus rhythm.  He underwent an A. fib ablation many

years back PVI





ROS: 


No fever chills or rigors, 


no cough, phlegm or expectoration, 


no nausea, vomiting or diarrhea, 


no hematuria, dysuria, 


no musculoskeletal complaints, 


no strokes or seizures, 


no skin lesions.





EXAMINATION:


97.2F pulse rate in the 60s respirations 18


Blood pressure 163/86


Breath sounds are equal bilaterally no rhonchi no crackles


Heart sounds are irregular





REVIEW OF LABS, ECG & MEDICAL DATA





Elevated white count 21,000















































Physical Exam


Vitals: 


                                   Vital Signs











  Temp Pulse Resp BP Pulse Ox


 


 06/27/23 10:19  97.2 F L  68  18  163/86  97








                                Intake and Output











 06/27/23 06/27/23 06/27/23





 06:59 14:59 22:59


 


Intake Total  1060 550


 


Output Total   225


 


Balance  1060 325


 


Intake:   


 


  IV  1060 550


 


Output:   


 


  Urine   225


 


Other:   


 


  Weight  104.9 kg 














Past Medical History


Past Medical History: Atrial Fibrillation, Cancer, Heart Failure, COPD, Diabetes

Mellitus, Hypertension, Osteoarthritis (OA), Prostate Disorder, Sleep 

Apnea/CPAP/BIPAP, Vascular Disorder


Additional Past Medical History / Comment(s): SEE DR. COKER'S H&P.     

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 

infections that drains into throat,  PROSTATE CA, CHRONIC LYMPHOCYTIC LEUKEMIA, 

HX RENAL CALCULUS WHEN PT IN HIS 30'S, DIZZY AT TIMES, C-PAP not used, had yocasta 

wrists fx Oct 2022


History of Any Multi-Drug Resistant Organisms: None Reported


Past Surgical History: Adenoidectomy, Appendectomy, Breast Surgery, Cardiac 

Ablation, Heart Catheterization, Pacemaker, Prostate Surgery, Tonsillectomy


Additional Past Surgical History / Comment(s): SX FOR KIDNEY STONES ,  LT BREAST

BENIGN TUMOR REMOVED, yocasta leg surg. for vascular issue, yocasta cataracts removed, 

nasal surg. for recurrent bleeding from Xarelto, laser surg.yocasta  eye, "updated 

pacemaker 2018", ORIF rt wrist/plate, prostatectomy


Past Anesthesia/Blood Transfusion Reactions: No Reported Reaction


Additional Past Anesthesia/Blood Transfusion Reaction / Comment(s): Pt has never

received blood.


Type of Cardiac Device: Permanent Pacemaker


Device Placement Date:: 2018


Smoking Status: Former smoker





- Past Family History


  ** Mother


Family Medical History: Cancer


Additional Family Medical History / Comment(s): BREAST CA





  ** Father


Family Medical History: Hypertension


Additional Family Medical History / Comment(s): LIVED [place] AGE 92





Physical Examination


                                   Vital Signs











  Temp Pulse Resp BP Pulse Ox


 


 06/27/23 10:19  97.2 F L  68  18  163/86  97








                                Intake and Output











 06/27/23 06/27/23 06/27/23





 06:59 14:59 22:59


 


Intake Total  1060 550


 


Output Total   225


 


Balance  1060 325


 


Intake:   


 


  IV  1060 550


 


Output:   


 


  Urine   225


 


Other:   


 


  Weight  104.9 kg 














Results





                                 06/27/23 10:10





                                       CBC











  06/27/23 Range/Units





  10:10 


 


WBC  21.9 H  (3.8-10.6)  k/uL


 


RBC  4.49  (4.30-5.90)  m/uL


 


Hgb  15.1  (13.0-17.5)  gm/dL


 


Hct  45.4  (39.0-53.0)  %


 


Plt Count  99 L  (150-450)  k/uL








                               Current Medications











Generic Name Dose Route Start Last Admin





  Trade Name Freq  PRN Reason Stop Dose Admin


 


Albuterol Sulfate  2.5 mg  06/27/23 17:16 





  Albuterol Nebulized 2.5 Mg/3 Ml  INHALATION  





  Q6H PRN  





  Dyspnea  


 


Amlodipine Besylate  10 mg  06/28/23 13:30 





  Amlodipine 10 Mg Tab  PO  





  PC-LUNCH AURELIANO  


 


Apixaban  5 mg  06/27/23 21:00 





  Apixaban 5 Mg Tab  PO  





  BID AURELIANO  





  Protocol  


 


Ezetimibe  10 mg  06/28/23 12:00 





  Ezetimibe 10 Mg Tab  PO  





  1200 AURELIANO  


 


Furosemide  20 mg  06/27/23 17:16 





  Furosemide 20 Mg Tab  PO  





  BID PRN  





  Edema  


 


Sodium Chloride  1,000 mls @ 50 mls/hr  06/27/23 06:02  06/27/23 10:21





  Saline 0.9%  IV  07/27/23 06:03  1,000 mls





  .Q20H [facility]   Administration


 


Losartan Potassium  50 mg  06/27/23 21:00 





  Losartan 50 Mg Tab  PO  





  BID [facility]  


 


Metformin HCl  500 mg  06/27/23 21:00 





  Metformin 500 Mg Tab  PO  





  BID [facility]  


 


Metoprolol Succinate  100 mg  06/27/23 21:00 





  Metoprolol Succinate (Er) 100 Mg Tab.Er.24h  PO  





  BID [facility]  


 


Non-Formulary Medication  10 mg  06/28/23 12:00 





  Rosuvastatin  PO  





  1200 [facility]  


 


Non-Formulary Medication  50 mg  06/28/23 09:00 





  Sitagliptin Phosphate [Januvia]  PO  





  QAM [facility]  


 


Non-Formulary Medication  20 meq  06/27/23 17:16 





  Potassium Chloride [Klor-Con 10 Er]  PO  





  DAILY PRN  





  takes if takes diuretic  


 


Nystatin  1 applic  06/27/23 06:02 





  Nystatin 100,000 Unit/Gm Powd 15 Gm  TOPICAL  07/27/23 06:03 





  BID [facility]  





  Protocol  








                                Intake and Output











 06/27/23 06/27/23 06/27/23





 06:59 14:59 22:59


 


Intake Total  1060 550


 


Output Total   225


 


Balance  1060 325


 


Intake:   


 


  IV  1060 550


 


Output:   


 


  Urine   225


 


Other:   


 


  Weight  104.9 kg 








                                 Patient Weight











 06/28/23





 06:59


 


Weight 104.9 kg








                                        





                                 06/27/23 10:10

## 2023-06-27 NOTE — P.EPPROC
- EP Procedure Note


Electrophysiology Procedure Note: 





PROCEDURE


A. fib ablation/





DIAGNOSIS


Persistent Atrial fibrillation, symptomatic, refractory to therapy





RESULT


No left atrial appendage mass seen on intracardiac echo


Successful A. fib ablation/pulmonary vein isolation of all veins using cryo-

ablation


Successful left atrial roof ablation


Successful posterior left atrial wall ablation


Ablation of the ridge between the left sided veins and the appendage


Ablation the mitral isthmus


Ablation of the anterior LA wall, focal


Ablation of the posterior inferior LA wall, focal


Complete entrance block in all 4 veins confirmed


No evidence for phrenic nerve injury


Esophageal deflection  YES 





Patient remained in an organized atrial fibrillation


Electrical cardioversion with a synchronized shock across the chest   YES 





PROCEDURE DETAILS


Written informed consent prior to procedure.  Patient brought to the EP lab.  

General anesthesia given.  Heparin administered.  ACT maintained above 300 

seconds


Biventricular pacemaker interrogated and reprogrammed to VVI mode 50 beats a 

minute.  


Both groins prepped and draped per protocol and venous sheaths placed.  

Esophagus intubated, circa catheter for temperature monitoring an endoscope for 

possible esophageal deflection.


Phrenic nerve monitoring performed.  Esophageal temperature monitoring pe

rformed.  Esophageal deflection performed if circa catheter overlapping with the

balloon or circa temperature less than 27.5C


Intracardiac echocardiography performed.  Pericardium evaluated.  Left atrial 

appendage evaluated.  Left atrium evaluated along with pulmonary veins


Transseptal catheterization performed under fluoroscopic guidance and 

intracardiac echo guidance


Cryoablation sheath exchanged, balloon catheter along with achieve catheter 

placed in the left atrium.


Pulmonary veins isolated in the following sequence: Left superior pulmonary vein

followed by left inferior pulmonary vein, followed by right inferior pulmonary 

vein and lastly right superior pulmonary vein.


Phrenic nerve stimulation along with capture thresholds within the SVC and right

superior pulmonary vein to identify the phrenic nerve proximity to the cryo- 

balloon. 


Pulmonary veins isolated and confirmed with entrance and exit block.  Phrenic 

nerve integrity confirmed at the end of the procedure





Ablation of the left atrial roof performed with sequential lesions from the left

superior to the right superior pulmonary veins.  Ablation of the electrograms 

confirmed with voltage mapping


Ablation of the left atrial posterior wall performed and confirmed with voltage 

mapping


Ablation of the mitral isthmus 


Ablation of the ridge between the left atrial appendage and pulmonary veins


Focal ablation anterior LA wall in the fractionated area


Focal ablation in the fractionated area posterior inferior LA wall below and 

posterior to right inferior pulmonary vein


Patient remained in organized atrial fibrillation cycle length approximately 280

ms


Electrical cardioversion performed for persistence of atrial fibrillation 

despite successful ablation.





Diagnostic catheters for the high right atrium, His bundle, coronary sinus 

placed.  LA and RA pressures recorded


RA pressure: 12/8/10


LA pressure: 20/60/12





Diagnostic EP study  with coronary sinus pacing and recording


Baseline measurements: Sinus cycle length 01/02/2003, WV interval 198 and QRS 

163 ms





Venous sheaths were removed and hemostasis assured with a closure device.  

Patient extubated and transferred to recovery





[employer] biventricular pacemaker interrogated


Atrial pacing threshold 0.8 V at 0.4 ms, P waves 1 mV pacing impedance 440 ohms


RV pacing threshold 1.2 V at 0.4 ms, R waves 11 mV and pacing impedance 390 ohms


LV pacing threshold 0.9 V at 0.5 ms and pacing impedance of 740 ohms





Increase procedural time: Following PVI, linear ablation left atrial roof, 

posterior wall of the LAD, mitral isthmus and the ridge between the left atrial 

appendage and the left-sided pulmonary veins, further activation mapping was 

performed


Fractionated electrograms with very long signals spanning the diastolic 

interval, were noted RF ablation performed in the anterior LA wall 


RF ablation performed in the posterior inferior LA wall below and posterior to 

the right inferior pulmonary vein performed


At this site the cycle length of the tachycardia decreased to 210 ms briefly but

did not terminate





PROCEDURES PERFORMED


Diagnostic EP study 


CS pacing and recording


Left and right transseptal catheterization


Catheter the mapping of the tachycardia 


Intracardiac echocardiography


Pulmonary vein isolation with transseptal and comprehensive EPS, [id number]


Extended procedure duration





Left atrial roof line, [id number]


Left atrial posterior wall ablation


Linear ablation, left atrium ridge, +[id number]


Linear ablation, mitral isthmus


Focal ablation anterior LA wall


Focal ablation posterior inferior LA wall





Electrical cardioversion with a synchronized shock across the chest [id number]

## 2023-06-28 LAB
GLUCOSE BLD-MCNC: 137 MG/DL (ref 70–110)
GLUCOSE BLD-MCNC: 156 MG/DL (ref 70–110)
GLUCOSE BLD-MCNC: 214 MG/DL (ref 70–110)
GLUCOSE BLD-MCNC: 226 MG/DL (ref 70–110)

## 2023-06-28 RX ADMIN — METOPROLOL SUCCINATE SCH MG: 100 TABLET, EXTENDED RELEASE ORAL at 20:22

## 2023-06-28 RX ADMIN — LOSARTAN POTASSIUM SCH MG: 50 TABLET, FILM COATED ORAL at 20:22

## 2023-06-28 RX ADMIN — METOPROLOL SUCCINATE SCH MG: 100 TABLET, EXTENDED RELEASE ORAL at 10:07

## 2023-06-28 RX ADMIN — NYSTATIN SCH: 100000 POWDER TOPICAL at 11:34

## 2023-06-28 RX ADMIN — LOSARTAN POTASSIUM SCH MG: 50 TABLET, FILM COATED ORAL at 10:08

## 2023-06-28 RX ADMIN — ISODIUM CHLORIDE SCH ML: 0.03 SOLUTION RESPIRATORY (INHALATION) at 20:37

## 2023-06-28 RX ADMIN — EZETIMIBE SCH MG: 10 TABLET ORAL at 12:52

## 2023-06-28 RX ADMIN — LINAGLIPTIN SCH MG: 5 TABLET, FILM COATED ORAL at 10:07

## 2023-06-28 RX ADMIN — ISODIUM CHLORIDE SCH ML: 0.03 SOLUTION RESPIRATORY (INHALATION) at 15:21

## 2023-06-28 RX ADMIN — ISODIUM CHLORIDE SCH ML: 0.03 SOLUTION RESPIRATORY (INHALATION) at 11:01

## 2023-06-28 RX ADMIN — ATORVASTATIN CALCIUM SCH MG: 20 TABLET, FILM COATED ORAL at 12:52

## 2023-06-28 RX ADMIN — NYSTATIN SCH: 100000 POWDER TOPICAL at 20:22

## 2023-06-28 RX ADMIN — APIXABAN SCH MG: 5 TABLET, FILM COATED ORAL at 10:07

## 2023-06-28 RX ADMIN — APIXABAN SCH MG: 5 TABLET, FILM COATED ORAL at 20:21

## 2023-06-28 NOTE — PN
PROGRESS NOTE



SUBJECTIVE:

Jerry Humerickhouse underwent an EP study and ablation yesterday.  This was a very long

procedure under general anesthesia, lasting over 4 to 5 hours.



He is doing well today.  He has minimal pleuritic chest discomfort.  He does not appear

short of breath.  He is sitting in a chair and eating breakfast.  No swallowing

problems.



His groins are mildly tender.  On examination, his blood pressure is normal.  He is

afebrile.  He is left ventricular paced at this time.



His pacemaker was interrogated yesterday before and after the procedure and was

functioning normally.



Breath sounds are reduced bilaterally with crackles at the bases.  Heart sounds S1, S2

normal.  No murmurs.



His labs are reviewed.  His white count is elevated because he has CLL.  Hemoglobin is

normal.



His electrolytes are normal.  Renal function is normal.  TSH is normal.  Hemoglobin A1c

is about 6.7.



IMPRESSION:

1. Persistent atrial fibrillation, status post extensive atrial fibrillation focusing

    only in the left atrium at this time.  The patient required electrical

    cardioversion at the end of the procedure.  The right atrium and the coronary sinus

    were not mapped.

2. Complete heart block, status post biventricular pacemaker.

3. Intermediate multivessel coronary artery disease.



PLAN:

Suggest discontinuing amiodarone completely.



Continue all other cardiac medications.  I emphasized the importance of taking statin,

Zetia, and Repatha along with anticoagulation and other heart failure medications.

Metoprolol will continue.



He is quite unsteady in his feet today and he has bilateral crackles.  Given incentive

spirometer and respiratory therapy.  Physical Therapy will be consulted for ambulation

today.  We will hold his discharge today, and if he is stable tomorrow, then he should

be able to go home.  We will reassess him later on the day for some more IV Lasix if

his crackles do not improve with ambulation and incentive spirometry.





MMODL / IJN: [id number] / Job#: [id number]

## 2023-06-29 VITALS
SYSTOLIC BLOOD PRESSURE: 127 MMHG | HEART RATE: 105 BPM | DIASTOLIC BLOOD PRESSURE: 77 MMHG | TEMPERATURE: 98 F | RESPIRATION RATE: 17 BRPM

## 2023-06-29 LAB
GLUCOSE BLD-MCNC: 150 MG/DL (ref 70–110)
GLUCOSE BLD-MCNC: 164 MG/DL (ref 70–110)

## 2023-06-29 RX ADMIN — LOSARTAN POTASSIUM SCH MG: 50 TABLET, FILM COATED ORAL at 08:29

## 2023-06-29 RX ADMIN — NYSTATIN SCH: 100000 POWDER TOPICAL at 12:08

## 2023-06-29 RX ADMIN — ATORVASTATIN CALCIUM SCH MG: 20 TABLET, FILM COATED ORAL at 12:35

## 2023-06-29 RX ADMIN — METOPROLOL SUCCINATE SCH MG: 100 TABLET, EXTENDED RELEASE ORAL at 08:30

## 2023-06-29 RX ADMIN — EZETIMIBE SCH MG: 10 TABLET ORAL at 12:35

## 2023-06-29 RX ADMIN — ISODIUM CHLORIDE SCH ML: 0.03 SOLUTION RESPIRATORY (INHALATION) at 07:43

## 2023-06-29 RX ADMIN — APIXABAN SCH MG: 5 TABLET, FILM COATED ORAL at 08:29

## 2023-06-29 RX ADMIN — LINAGLIPTIN SCH MG: 5 TABLET, FILM COATED ORAL at 08:30
